# Patient Record
Sex: FEMALE | Race: WHITE | NOT HISPANIC OR LATINO | URBAN - METROPOLITAN AREA
[De-identification: names, ages, dates, MRNs, and addresses within clinical notes are randomized per-mention and may not be internally consistent; named-entity substitution may affect disease eponyms.]

---

## 2020-02-06 NOTE — PATIENT DISCUSSION
PLAN:  CE/IOL OD (PER JWK) THEN OS +/- ISTENT OU, GOAL BENJI, PT WANTS TO THINK ABOUT OPTIONS AND WILL CALL BACK.   CANDIDATE FOR ALL LENS OPTIONS.

## 2020-02-11 NOTE — PATIENT DISCUSSION
PHOTOGRAPHS: I have reviewed the external ocular photographs of this patient which show the following: significant ptosis both upper eyelids and senile ectropion left lower eyelid.

## 2020-02-11 NOTE — PATIENT DISCUSSION
[place] Visual Field 36 point screen: I have reviewed the visual fields both taped and untaped on this patient which demonstrate significant obstruction of the patient's peripheral visual field on the both eyes.

## 2020-02-17 NOTE — PATIENT DISCUSSION
Patient advised of the right to post-operative care by the surgeon. Patient is fully informed of, and agreed to, co-management with their primary optometric physician. Post-operative care by the surgeon is not medically necessary and co-management is clinically appropriate. Patient has received itemization of fees related to cataract surgery. Transfer of care letter completed for the patient. Transfer care of right eye to Dr. Oz Galaviz on 02/17/20. Patient instructed to call immediately if any new distortion, blurring, decreased vision or eye pain.

## 2020-02-24 NOTE — PATIENT DISCUSSION
Patient advised of the right to post-operative care by the surgeon. Patient is fully informed of, and agreed to, co-management with their primary optometric physician. Post-operative care by the surgeon is not medically necessary and co-management is clinically appropriate. Patient has received itemization of fees related to cataract surgery. Transfer of care letter completed for the patient. Transfer care of right eye to Dr. Mayi Mayer on 02/17/20. Patient instructed to call immediately if any new distortion, blurring, decreased vision or eye pain.

## 2020-02-24 NOTE — PATIENT DISCUSSION
Patient advised of the right to post-operative care by the surgeon. Patient is fully informed of, and agreed to, co-management with their primary optometric physician. Post-operative care by the surgeon is not medically necessary and co-management is clinically appropriate. Patient has received itemization of fees related to cataract surgery. Transfer of care letter completed for the patient. Transfer care of right eye to Dr. Juliana Tatum on 02/17/20. Patient instructed to call immediately if any new distortion, blurring, decreased vision or eye pain.

## 2020-02-24 NOTE — PATIENT DISCUSSION
Patient achieved a 15% reduction in IOP from pretreatment levels or a plan is in place to achieve this goal. Patient states tsh  Was done at [facility] today looking for results.  Patient informed she will be called with results 9/7/17 after reviewed by Dr Kong. Patient verbalized understanding of all information given

## 2020-02-24 NOTE — PATIENT DISCUSSION
Patient advised of the right to post-operative care by the surgeon. Patient is fully informed of, and agreed to, co-management with their primary optometric physician. Post-operative care by the surgeon is not medically necessary and co-management is clinically appropriate. Patient has received itemization of fees related to cataract surgery. Transfer of care letter completed for the patient. Transfer care of right eye to Dr. Srikanth Hagan on 02/17/20. Patient instructed to call immediately if any new distortion, blurring, decreased vision or eye pain.

## 2020-03-06 NOTE — PATIENT DISCUSSION
Recommended artificial tears to use: 1 drop 4x a day in both eyes. [Menopause Age____] : age at menopause was [unfilled] [History of Hormone Replacement Treatment] : has no history of hormone replacement treatment [de-identified] : nocturnal hot flashes

## 2020-05-06 NOTE — PATIENT DISCUSSION
Discussed the risks/benefits of laser capsulotomy. Patient elects to proceed with OS. Post op with Dr. Damari Rodgesr as scheduled. Recommend observation of  PCO OD.

## 2020-05-06 NOTE — PATIENT DISCUSSION
Discussed the risks/benefits of laser capsulotomy. Patient elects to proceed with OS. Post op with Dr. Donnie Nogueira as scheduled. Recommend observation of  PCO OD.

## 2020-05-06 NOTE — PROCEDURE NOTE: SURGICAL
<p>Prior to commencing surgery patient identification, surgical procedure, site, and side were confirmed by Dr. Ruben Green. Following topical proparacaine anesthesia, the patient was positioned at the YAG laser, a contact lens coupled to the cornea with methylcellulose and an axial posterior capsulotomy performed without complication using 3.2 Mj x 19. Excess methylcellulose was washed from the eye, one drop of Alphagan was instilled and the patient returned to the holding area having tolerated the procedure well and without complication. </p><p>MRN:[id number]</p>

## 2020-06-12 NOTE — PATIENT DISCUSSION
Also, please do not hesitate to call us if you have any concerns not addressed by this information. Please call [phone] and we will do everything we can to help you during this period.

## 2020-12-02 NOTE — PROCEDURE NOTE: SURGICAL
<p>Prior to commencing surgery patient identification, surgical procedure, site, and side were confirmed by Dr. Rojas Chino. Following topical proparacaine anesthesia, the patient was positioned at the YAG laser, a contact lens coupled to the cornea with methylcellulose and an axial posterior capsulotomy performed without complication using 3.2 Mj x 7. Excess methylcellulose was washed from the eye, one drop of Alphagan was instilled and the patient returned to the holding area having tolerated the procedure well and without complication. </p><p>MRN:[id number]</p>

## 2022-01-20 NOTE — PATIENT DISCUSSION
Patient Education: Nsaids Pregnancy And Lactation Text: These medications are considered safe up to 30 weeks gestation. It is excreted in breast milk.

## 2022-03-29 ENCOUNTER — PREPPED CHART (OUTPATIENT)
Dept: URBAN - METROPOLITAN AREA CLINIC 50 | Facility: CLINIC | Age: 72
End: 2022-03-29

## 2022-04-04 ENCOUNTER — NEW PATIENT (OUTPATIENT)
Dept: URBAN - METROPOLITAN AREA CLINIC 50 | Facility: CLINIC | Age: 72
End: 2022-04-04

## 2022-04-04 DIAGNOSIS — E11.3591: ICD-10-CM

## 2022-04-04 DIAGNOSIS — H25.813: ICD-10-CM

## 2022-04-04 DIAGNOSIS — E11.3292: ICD-10-CM

## 2022-04-04 DIAGNOSIS — H43.811: ICD-10-CM

## 2022-04-04 PROCEDURE — 92004 COMPRE OPH EXAM NEW PT 1/>: CPT

## 2022-04-04 PROCEDURE — 92134 CPTRZ OPH DX IMG PST SGM RTA: CPT

## 2022-04-04 ASSESSMENT — VISUAL ACUITY
OU_CC: J1
OD_CC: 20/40
OS_CC: 20/40+1
OU_CC: 20/30-1

## 2022-04-04 ASSESSMENT — TONOMETRY
OS_IOP_MMHG: 16
OD_IOP_MMHG: 15

## 2022-04-04 NOTE — PATIENT DISCUSSION
Possible abnormal blood vessel that is causing bleeding. Will refer patient to [facility] for further testing and treatment.

## 2022-10-24 ENCOUNTER — ESTABLISHED PATIENT (OUTPATIENT)
Dept: URBAN - METROPOLITAN AREA CLINIC 50 | Facility: CLINIC | Age: 72
End: 2022-10-24

## 2022-10-24 DIAGNOSIS — H25.813: ICD-10-CM

## 2022-10-24 DIAGNOSIS — E11.3591: ICD-10-CM

## 2022-10-24 DIAGNOSIS — E11.3292: ICD-10-CM

## 2022-10-24 DIAGNOSIS — H43.811: ICD-10-CM

## 2022-10-24 PROCEDURE — 92014 COMPRE OPH EXAM EST PT 1/>: CPT

## 2022-10-24 PROCEDURE — 92134 CPTRZ OPH DX IMG PST SGM RTA: CPT

## 2022-10-24 ASSESSMENT — TONOMETRY
OS_IOP_MMHG: 16
OD_IOP_MMHG: 15

## 2022-10-24 ASSESSMENT — VISUAL ACUITY
OS_GLARE: 20/80
OU_CC: J1@16"
OD_GLARE: 20/200
OS_CC: 20/40-2
OU_CC: 20/30-1
OS_GLARE: 20/50
OS_PH: 20/30-1
OD_CC: 20/30-1
OD_GLARE: 20/50

## 2022-10-24 NOTE — PATIENT DISCUSSION
Patient did not pursue appointment with [facility]. Will resend referral out to [facility].

## 2022-10-24 NOTE — PATIENT DISCUSSION
Possible abnormal blood vessel that is causing bleeding. Will refer patient to [facility] for further testing and treatment.

## 2023-04-10 ENCOUNTER — POST-OP (OUTPATIENT)
Dept: URBAN - METROPOLITAN AREA CLINIC 50 | Facility: CLINIC | Age: 73
End: 2023-04-10

## 2023-04-10 DIAGNOSIS — Z98.42: ICD-10-CM

## 2023-04-10 DIAGNOSIS — Z98.41: ICD-10-CM

## 2023-04-10 PROCEDURE — 99024 POSTOP FOLLOW-UP VISIT: CPT

## 2023-04-10 ASSESSMENT — KERATOMETRY
OS_AXISANGLE2_DEGREES: 60
OS_K2POWER_DIOPTERS: 43.25
OD_K1POWER_DIOPTERS: 44.37
OD_AXISANGLE_DEGREES: 25
OD_K2POWER_DIOPTERS: 43.62
OD_AXISANGLE2_DEGREES: 115
OS_AXISANGLE_DEGREES: 150
OS_K1POWER_DIOPTERS: 44.00

## 2023-04-10 ASSESSMENT — VISUAL ACUITY
OD_PH: 20/25-1
OS_SC: 20/30-1
OD_SC: 20/30-1

## 2023-04-10 ASSESSMENT — TONOMETRY
OD_IOP_MMHG: 13
OS_IOP_MMHG: 15

## 2024-11-18 NOTE — PATIENT DISCUSSION
11/18/24 1054   PASRR   PASRR Status Approved/Complete        Recommended artificial tears to use: 1 drop 4x a day in both eyes.

## 2025-02-10 ENCOUNTER — CONSULTATION/EVALUATION (OUTPATIENT)
Age: 75
End: 2025-02-10

## 2025-02-10 DIAGNOSIS — H26.493: ICD-10-CM

## 2025-02-10 DIAGNOSIS — E11.3592: ICD-10-CM

## 2025-02-10 DIAGNOSIS — H43.811: ICD-10-CM

## 2025-02-10 PROCEDURE — 92134 CPTRZ OPH DX IMG PST SGM RTA: CPT

## 2025-02-10 PROCEDURE — 99204 OFFICE O/P NEW MOD 45 MIN: CPT | Mod: 57

## 2025-02-10 PROCEDURE — 66821 AFTER CATARACT LASER SURGERY: CPT

## 2025-02-17 ENCOUNTER — CLINIC PROCEDURE ONLY (OUTPATIENT)
Age: 75
End: 2025-02-17

## 2025-02-17 DIAGNOSIS — H26.492: ICD-10-CM

## 2025-02-17 PROCEDURE — 66821 AFTER CATARACT LASER SURGERY: CPT | Mod: 79,LT

## 2025-02-26 ENCOUNTER — POST-OP (OUTPATIENT)
Age: 75
End: 2025-02-26

## 2025-02-26 DIAGNOSIS — Z98.890: ICD-10-CM

## 2025-02-26 PROCEDURE — 99024 POSTOP FOLLOW-UP VISIT: CPT

## 2025-03-24 ENCOUNTER — POST-OP (OUTPATIENT)
Age: 75
End: 2025-03-24

## 2025-03-24 DIAGNOSIS — Z98.890: ICD-10-CM

## 2025-03-24 PROCEDURE — 99024 POSTOP FOLLOW-UP VISIT: CPT
